# Patient Record
Sex: MALE | Race: WHITE | Employment: FULL TIME | URBAN - METROPOLITAN AREA
[De-identification: names, ages, dates, MRNs, and addresses within clinical notes are randomized per-mention and may not be internally consistent; named-entity substitution may affect disease eponyms.]

---

## 2022-03-02 ENCOUNTER — THERAPY VISIT (OUTPATIENT)
Dept: PHYSICAL THERAPY | Facility: CLINIC | Age: 42
End: 2022-03-02
Payer: COMMERCIAL

## 2022-03-02 DIAGNOSIS — M54.41 ACUTE RIGHT-SIDED LOW BACK PAIN WITH RIGHT-SIDED SCIATICA: ICD-10-CM

## 2022-03-02 PROCEDURE — 97110 THERAPEUTIC EXERCISES: CPT | Mod: GP | Performed by: PHYSICAL THERAPIST

## 2022-03-02 PROCEDURE — 97112 NEUROMUSCULAR REEDUCATION: CPT | Mod: GP | Performed by: PHYSICAL THERAPIST

## 2022-03-02 PROCEDURE — 97161 PT EVAL LOW COMPLEX 20 MIN: CPT | Mod: GP | Performed by: PHYSICAL THERAPIST

## 2022-03-02 NOTE — LETTER
[facility]  [address] [address] [place] [id number]  [phone]    2022    Re: Flo Ascencio   :   1980  MRN:  [id number]   REFERRING PHYSICIAN:   Myriam KENDALL [facility]  Date of Initial Evaluation:  2022  Visits:  Rxs Used: 1  Reason for Referral:  Acute right-sided low back pain with right-sided sciatica    EVALUATION SUMMARY  Physical Therapy Initial Evaluation  Subjective:  Patient Health History  Flo Ascencio being seen for Low back pain.   Problem began: 3/1/2022 (MD appointment).   Problem occurred: Gradually worsening over the past 2 weeks   Pain is reported as 6/10 on pain scale.  General health as reported by patient is good.  Pertinent medical history includes: none.   Red flags:  None as reported by patient.  Medical allergies: none.   Surgeries include:  Other.    Current medications:  Anti-inflammatory.    Current occupation is .   Primary job tasks include:  Computer work and prolonged sitting.     Therapist Generated HPI Evaluation  Problem details: Pt presents to clinic with complaints of R sided low back and R thigh pain starting 2 weeks ago after shoveling snow. Pt attempted to perform cardio and pilates workout, feeling increased pain. Pt having most pain upon waking in the morning. Pt noticing more pain into R lateral/anterior thigh at this time. Pt had MD appointment on 3/1/2022 and referred to PT. .         Type of problem:  Lumbar.  This is a new condition.  Condition occurred with:  Insidious onset.  Where condition occurred: for unknown reasons.  Patient reports pain:  Lower lumbar spine and lumbar spine right.  Pain is described as aching, stabbing and burning and is intermittent.  Pain radiates to:  Thigh right and knee right. Pain is worse during the night.  Since onset symptoms are gradually worsening.  Associated  symptoms:  Loss of motion/stiffness. Symptoms are exacerbated by lifting, sitting, certain positions, bending and other (stairs)  and relieved by heat, NSAID's and activity/movement.  Imaging testing: none.  Past treatment: none. There was none improvement following previous treatment.  Restrictions due to condition include:  Working in normal job without restrictions.  Barriers include:  None as reported by patient.    Objective:  Standing Alignment:    Shoulder/UE:  Rounded shoulders  Lumbar/SI Evaluation  ROM:    AROM Lumbar:   Flexion:            To mid tibia +pain R low back and thigh  Ext:                    75%    Side Bend:        Left:  75%    Right:  75%  Rotation:           Left:  75%    Right:  75%  Side Glide:        Left:     Right:   Lumbar Myotomes:  normal  Neural Tension/Mobility:    Left side:SLR; Femoral Nerve or Slump  negative.   Right side:   Femoral Nerve; Slump or SLR  negative.   Lumbar Palpation:    Tenderness not present at Left:    Erector Spinae; Piriformis or PSIS  Tenderness present at Right: Erector Spinae  Tenderness not present at Right:  Piriformis or PSIS  Spinal Segmental Conclusions:   Level: Hypo noted at L5 and L4    Assessment/Plan:    Patient is a 41 year old male with lumbar complaints.    Patient has the following significant findings with corresponding treatment plan.                Diagnosis 1:  Low back pain  Pain -  hot/cold therapy, manual therapy, self management, education and home program  Decreased ROM/flexibility - manual therapy, therapeutic exercise, therapeutic activity and home program  Decreased strength - therapeutic exercise, therapeutic activities and home program  Impaired muscle performance - neuro re-education and home program  Decreased function - therapeutic activities and home program  Impaired posture - neuro re-education, therapeutic activities and home program    Therapy Evaluation Codes:   1) History comprised of:   Personal factors that impact  the plan of care:      None.    Comorbidity factors that impact the plan of care are:      None.     Medications impacting care: Anti-inflammatory.  2) Examination of Body Systems comprised of:   Body structures and functions that impact the plan of care:      Lumbar spine.   Activity limitations that impact the plan of care are:      Bending, Lifting, Sitting and Standing.  3) Clinical presentation characteristics are:   Stable/Uncomplicated.  4) Decision-Making    Low complexity using standardized patient assessment instrument and/or measureable assessment of functional outcome.  Cumulative Therapy Evaluation is: Low complexity.    Previous and current functional limitations:  (See Goal Flow Sheet for this information)    Short term and Long term goals: (See Goal Flow Sheet for this information)     Communication ability:  Patient appears to be able to clearly communicate and understand verbal and written communication and follow directions correctly.  Treatment Explanation - The following has been discussed with the patient:   RX ordered/plan of care  Anticipated outcomes  Possible risks and side effects  This patient would benefit from PT intervention to resume normal activities.   Rehab potential is good.    Frequency:  1 X week, once daily  Duration:  for 6 weeks  Discharge Plan:  Achieve all LTG.  Independent in home treatment program.  Return to previous functional level by discharge.  Reach maximal therapeutic benefit.    Thank you for your referral.    INQUIRIES  Therapist: Long Yeboah DPT  [facility] [address] [address]  Phone: [phone]  Fax: [phone]

## 2022-03-02 NOTE — PROGRESS NOTES
Physical Therapy Initial Evaluation  Subjective:    Patient Health History  Flo Ascencio being seen for Low back pain.     Problem began: 3/1/2022 (MD appointment).   Problem occurred: Gradually worsening over the past 2 weeks   Pain is reported as 6/10 on pain scale.  General health as reported by patient is good.  Pertinent medical history includes: none.   Red flags:  None as reported by patient.  Medical allergies: none.   Surgeries include:  Other.    Current medications:  Anti-inflammatory.    Current occupation is .   Primary job tasks include:  Computer work and prolonged sitting.                  Therapist Generated HPI Evaluation  Problem details: Pt presents to clinic with complaints of R sided low back and R thigh pain starting 2 weeks ago after shoveling snow. Pt attempted to perform cardio and pilates workout, feeling increased pain. Pt having most pain upon waking in the morning. Pt noticing more pain into R lateral/anterior thigh at this time. Pt had MD appointment on 3/1/2022 and referred to PT. .         Type of problem:  Lumbar.    This is a new condition.  Condition occurred with:  Insidious onset.  Where condition occurred: for unknown reasons.  Patient reports pain:  Lower lumbar spine and lumbar spine right.  Pain is described as aching, stabbing and burning and is intermittent.  Pain radiates to:  Thigh right and knee right. Pain is worse during the night.  Since onset symptoms are gradually worsening.  Associated symptoms:  Loss of motion/stiffness. Symptoms are exacerbated by lifting, sitting, certain positions, bending and other (stairs)  and relieved by heat, NSAID's and activity/movement.  Imaging testing: none.  Past treatment: none. There was none improvement following previous treatment.  Restrictions due to condition include:  Working in normal job without restrictions.  Barriers include:  None as reported by patient.                        Objective:  Standing  Alignment:      Shoulder/UE:  Rounded shoulders                             Lumbar/SI Evaluation  ROM:    AROM Lumbar:   Flexion:            To mid tibia +pain R low back and thigh  Ext:                    75%    Side Bend:        Left:  75%    Right:  75%  Rotation:           Left:  75%    Right:  75%  Side Glide:        Left:     Right:           Lumbar Myotomes:  normal                  Neural Tension/Mobility:      Left side:SLR; Femoral Nerve or Slump  negative.     Right side:   Femoral Nerve; Slump or SLR  negative.   Lumbar Palpation:      Tenderness not present at Left:    Erector Spinae; Piriformis or PSIS  Tenderness present at Right: Erector Spinae  Tenderness not present at Right:  Piriformis or PSIS      Spinal Segmental Conclusions:     Level: Hypo noted at L5 and L4                                                   General     ROS    Assessment/Plan:    Patient is a 41 year old male with lumbar complaints.    Patient has the following significant findings with corresponding treatment plan.                Diagnosis 1:  Low back pain  Pain -  hot/cold therapy, manual therapy, self management, education and home program  Decreased ROM/flexibility - manual therapy, therapeutic exercise, therapeutic activity and home program  Decreased strength - therapeutic exercise, therapeutic activities and home program  Impaired muscle performance - neuro re-education and home program  Decreased function - therapeutic activities and home program  Impaired posture - neuro re-education, therapeutic activities and home program    Therapy Evaluation Codes:   1) History comprised of:   Personal factors that impact the plan of care:      None.    Comorbidity factors that impact the plan of care are:      None.     Medications impacting care: Anti-inflammatory.  2) Examination of Body Systems comprised of:   Body structures and functions that impact the plan of care:      Lumbar spine.   Activity limitations that impact the  plan of care are:      Bending, Lifting, Sitting and Standing.  3) Clinical presentation characteristics are:   Stable/Uncomplicated.  4) Decision-Making    Low complexity using standardized patient assessment instrument and/or measureable assessment of functional outcome.  Cumulative Therapy Evaluation is: Low complexity.    Previous and current functional limitations:  (See Goal Flow Sheet for this information)    Short term and Long term goals: (See Goal Flow Sheet for this information)     Communication ability:  Patient appears to be able to clearly communicate and understand verbal and written communication and follow directions correctly.  Treatment Explanation - The following has been discussed with the patient:   RX ordered/plan of care  Anticipated outcomes  Possible risks and side effects  This patient would benefit from PT intervention to resume normal activities.   Rehab potential is good.    Frequency:  1 X week, once daily  Duration:  for 6 weeks  Discharge Plan:  Achieve all LTG.  Independent in home treatment program.  Return to previous functional level by discharge.  Reach maximal therapeutic benefit.    Please refer to the daily flowsheet for treatment today, total treatment time and time spent performing 1:1 timed codes.

## 2022-03-04 ENCOUNTER — TRANSCRIBE ORDERS (OUTPATIENT)
Dept: OTHER | Age: 42
End: 2022-03-04
Payer: COMMERCIAL

## 2022-03-04 DIAGNOSIS — M54.31 RIGHT SIDED SCIATICA: Primary | ICD-10-CM

## 2022-03-04 DIAGNOSIS — M54.41 BILATERAL LOW BACK PAIN WITH RIGHT-SIDED SCIATICA: ICD-10-CM

## 2022-03-10 ENCOUNTER — THERAPY VISIT (OUTPATIENT)
Dept: PHYSICAL THERAPY | Facility: CLINIC | Age: 42
End: 2022-03-10
Payer: COMMERCIAL

## 2022-03-10 DIAGNOSIS — M54.41 ACUTE RIGHT-SIDED LOW BACK PAIN WITH RIGHT-SIDED SCIATICA: ICD-10-CM

## 2022-03-10 PROCEDURE — 97110 THERAPEUTIC EXERCISES: CPT | Mod: GP | Performed by: PHYSICAL THERAPIST

## 2022-03-10 PROCEDURE — 97140 MANUAL THERAPY 1/> REGIONS: CPT | Mod: GP | Performed by: PHYSICAL THERAPIST

## 2022-03-25 ENCOUNTER — THERAPY VISIT (OUTPATIENT)
Dept: PHYSICAL THERAPY | Facility: CLINIC | Age: 42
End: 2022-03-25
Payer: COMMERCIAL

## 2022-03-25 DIAGNOSIS — M54.41 ACUTE RIGHT-SIDED LOW BACK PAIN WITH RIGHT-SIDED SCIATICA: ICD-10-CM

## 2022-03-25 PROCEDURE — 97140 MANUAL THERAPY 1/> REGIONS: CPT | Mod: GP | Performed by: PHYSICAL THERAPIST

## 2022-03-25 PROCEDURE — 97110 THERAPEUTIC EXERCISES: CPT | Mod: GP | Performed by: PHYSICAL THERAPIST

## 2022-03-25 PROCEDURE — 97112 NEUROMUSCULAR REEDUCATION: CPT | Mod: GP | Performed by: PHYSICAL THERAPIST

## 2022-04-27 NOTE — PROGRESS NOTES
Discharge Note    Progress reporting period is from initial evaluation date (please see noted date below) to Mar 25, 2022.  Linked Episodes   Type: Episode: Status: Noted: Resolved: Last update: Updated by:   PHYSICAL THERAPY Low back 3/2/2022 Active 3/2/2022  3/25/2022  3:47 PM Ronni Yeboah, PT      Comments:       Flo failed to follow up and current status is unknown.  Please see information below for last relevant information on current status.  Patient seen for 3 visits.    SUBJECTIVE  Subjective changes noted by patient:  Flo pleased with overall progress. Overall reports functioning at 85-90% at this time.   .  Current pain level is 0/10.     Previous pain level was  6/10.   Changes in function:  Yes (See Goal flowsheet attached for changes in current functional level)  Adverse reaction to treatment or activity: None    OBJECTIVE  Changes noted in objective findings: Lumbar AROM: flexion to ankle slight pain, extension 75%, bilateral SB 75%, bilateral rotation WNL -pain     ASSESSMENT/PLAN  Diagnosis: Low back pain   Updated problem list and treatment plan:   Pain - HEP  Decreased ROM/flexibility - HEP  Decreased strength - HEP  Impaired muscle performance - HEP  Impaired posture - HEP  STG/LTGs have been met or progress has been made towards goals:  Yes, please see goal flowsheet for most current information  Assessment of Progress: current status is unknown.    Last current status: Pt is progressing as expected   Self Management Plans:  HEP  I have re-evaluated this patient and find that the nature, scope, duration and intensity of the therapy is appropriate for the medical condition of the patient.  Flo continues to require the following intervention to meet STG and LTG's:  HEP.    Recommendations:  Discharge with current home program.  Patient to follow up with MD as needed.    Please refer to the daily flowsheet for treatment today, total treatment time and time spent performing 1:1 timed  codes.